# Patient Record
Sex: MALE | URBAN - METROPOLITAN AREA
[De-identification: names, ages, dates, MRNs, and addresses within clinical notes are randomized per-mention and may not be internally consistent; named-entity substitution may affect disease eponyms.]

---

## 2024-06-07 ENCOUNTER — APPOINTMENT (OUTPATIENT)
Age: 29
Setting detail: DERMATOLOGY
End: 2024-06-07

## 2024-06-07 DIAGNOSIS — D485 NEOPLASM OF UNCERTAIN BEHAVIOR OF SKIN: ICD-10-CM

## 2024-06-07 DIAGNOSIS — Z00.00 ENCOUNTER FOR GENERAL ADULT MEDICAL EXAMINATION WITHOUT ABNORMAL FINDINGS: ICD-10-CM

## 2024-06-07 PROBLEM — D48.5 NEOPLASM OF UNCERTAIN BEHAVIOR OF SKIN: Status: ACTIVE | Noted: 2024-06-07

## 2024-06-07 PROCEDURE — OTHER ADDITIONAL NOTES: OTHER

## 2024-06-07 PROCEDURE — OTHER ORDER ULTRASOUND: OTHER

## 2024-06-07 PROCEDURE — 99202 OFFICE O/P NEW SF 15 MIN: CPT

## 2024-06-07 PROCEDURE — OTHER MIPS QUALITY: OTHER

## 2024-06-07 PROCEDURE — OTHER PHOTO-DOCUMENTATION: OTHER

## 2024-06-07 PROCEDURE — OTHER COUNSELING: OTHER

## 2024-06-07 ASSESSMENT — LOCATION SIMPLE DESCRIPTION DERM: LOCATION SIMPLE: CHIN

## 2024-06-07 ASSESSMENT — LOCATION DETAILED DESCRIPTION DERM: LOCATION DETAILED: RIGHT CHIN

## 2024-06-07 ASSESSMENT — LOCATION ZONE DERM: LOCATION ZONE: FACE

## 2024-06-07 NOTE — PROCEDURE: ORDER ULTRASOUND
Subcutaneous Lesion Ultrasound Reason: 3-month history of a subcutaneous nodule with some discomfort.
Time Frame Unit: day(s)
Perform At: [facility]
Ultrasound Protocol: Ultrasound of Subcutaneous Mass
Lesion Location: Right chin
Priority: normal
Provider: Sima Burton PA-C
Detail Level: Simple

## 2024-06-07 NOTE — PROCEDURE: ADDITIONAL NOTES
Render Risk Assessment In Note?: no
Additional Notes: Patient describes a 3-month history of a firm, subcutaneous nodule under the right chin. The lesion has been painless and stable until the past week, when patient says he has begun to feel some discomfort in the area. Patient was unable to locate the nodule in the office today, and I was also unable to appreciate any lesion specifically. I recommend an ultrasound of the area to determine whether there is a subcutaneous lesion present in the area.
Detail Level: Zone